# Patient Record
Sex: MALE | Race: WHITE | Employment: FULL TIME | URBAN - METROPOLITAN AREA
[De-identification: names, ages, dates, MRNs, and addresses within clinical notes are randomized per-mention and may not be internally consistent; named-entity substitution may affect disease eponyms.]

---

## 2020-06-11 ENCOUNTER — OFFICE VISIT (OUTPATIENT)
Dept: ORTHOPEDIC SURGERY | Age: 30
End: 2020-06-11
Payer: COMMERCIAL

## 2020-06-11 VITALS — WEIGHT: 165 LBS | HEIGHT: 73 IN | BODY MASS INDEX: 21.87 KG/M2

## 2020-06-11 PROCEDURE — 99203 OFFICE O/P NEW LOW 30 MIN: CPT | Performed by: FAMILY MEDICINE

## 2020-06-11 PROCEDURE — MISC916 THIGH SLEEVE: Performed by: FAMILY MEDICINE

## 2020-06-11 RX ORDER — NAPROXEN 500 MG/1
500 TABLET ORAL 2 TIMES DAILY WITH MEALS
Qty: 60 TABLET | Refills: 3 | Status: SHIPPED | OUTPATIENT
Start: 2020-06-11

## 2020-06-11 NOTE — LETTER
[facility] [address] [place] [address]  [address] [address] [facility] [address]  Phone: [phone]  Fax: [phone]    Kortney Mcqueen MD        June 11, 2020     Patient: Shahzad Kern   YOB: 1990   Date of Visit: 6/11/2020       To Whom It May Concern: It is my medical opinion that Shahzad Kern may return to light duty immediately with the following restrictions: lifting/carrying not to exceed 20 lbs. , pushing/pulling not to exceed 20 lbs. until seen back in the office in 3-4 weeks. If you have any questions or concerns, please don't hesitate to call.     Sincerely,        Kortney Mcqueen MD

## 2020-06-11 NOTE — PROGRESS NOTES
10'4 Chief Complaint  Leg Pain (N LEFT LEG)    History of Present Illness for New Patient:    Dorothy Bence is a 31 y.o. male is a very pleasant physically active white male who works as a  for [employer] who works part-time in [employer] but also at [employer] who is being seen today upon self-referral for evaluation of injury to his left leg knee and hamstring. He is a recreational  who plays between 2-3 times per week and on 6/1/2020 he was rounding first base when he felt a \"tweak\" to the mid belly of his left hamstring. There is no associated pop or crack and he did have only mild pain at that time. There is no swelling or bruising and he underwent relative rest only for the next 5 days and on 6/6/2020  he was playing in another softball game and during the games first play he hit the ball and sprinted out of the batter's box when he felt a sharp pop to the mid belly of his left hamstring. He did have immediate pain and was obviously hobbling but finished up the remainder of his softball game. Over the next 48 hours he began with progressive pain swelling and the development of ecchymosis to the posterior aspect of his left hamstring with associated 7-8 out of 10 pain he was actively limping for the first couple of days and was taking ibuprofen 800 mg twice daily and using a wrap. He did not use crutches and has no previous history of hamstring injury. He feels very tight with the majority his pain being mid belly extending to the posterior aspect of his left knee. He is very little pulling sensation to the proximal hamstring and does feel very stiff and weak. He is not having difficulty sleeping but with more aggressive activities and full toeing and pushing off he can still have pain the range of 7-8 out of 10. This is more sharp in character whereas at rest it is only a mild ache at 2-3 out of 10.   He has no previous history of substantial hamstring or knee injury. He is sleeping more comfortably at this point is being seen today for orthopedic and sports consultation with imaging. Pain Assessment  Location of Pain: Leg  Location Modifiers: Left  Severity of Pain: 4  Quality of Pain: Sharp, Aching(SPASM)  Duration of Pain: Persistent  Frequency of Pain: Constant  Aggravating Factors: Standing, Walking  Limiting Behavior: Yes  Relieving Factors: Rest, Nsaids  Result of Injury: Yes  Work-Related Injury: No  Are there other pain locations you wish to document?: No     I have reviewed and attest the documentation of the HPI documented by my . I will make any changes if necessary. Enc Date: 6/11/2020  Time: 3:51 PM  Provider: Mary Martin MD        Review of Systems  Pertinent items are noted in HPI  Review of systems reviewed from Patient History Form dated on 6/11/2020 and available in the patient's chart under the Media tab. Vital Signs     Ht 6' 1\" (1.854 m)   Wt 165 lb (74.8 kg)   BMI 21.77 kg/m²     Past Medical History   has no past medical history on file. Past Surgical History   has no past surgical history on file. Social History     Tobacco Use    Smoking status: Never Smoker    Smokeless tobacco: Never Used    Tobacco comment: Advised not start   Substance Use Topics    Alcohol use: Yes     Comment: 12 on weekends    Drug use: No        Current Outpatient Medications   Medication Sig Dispense Refill    naproxen (NAPROSYN) 500 MG tablet Take 1 tablet by mouth 2 times daily (with meals) 60 tablet 3     No current facility-administered medications for this visit. General Exam:   Constitutional: Patient is adequately groomed with no evidence of malnutrition  DTRs: Deep tendon reflexes are intact  Mental Status: The patient is oriented to time, place and person. The patient's mood and affect are appropriate.   Lymphatic: The lymphatic examination bilaterally reveals all areas to be without therapy and placed him on Naprosyn 500 mg 1 pill twice daily. He is out of competitive softball for at least the next 4 to 6 weeks to allow for adequate rehabilitation and functional progression. We did write him a work note recommending no lifting carrying pushing pulling greater than 20 to 25 pounds to allow for adequate hamstring rehabilitation over the next several weeks. Icing and activity modification importance of performing his home exercise program was discussed. We will see him back in about 3 to 4 weeks for follow-up. He will contact us in interim with questions or concerns. This dictation was performed with a verbal recognition program (DRAGON) and it was checked for errors. It is possible that there are still dictated errors within this office note. If so, please bring any errors to my attention for an addendum. All efforts were made to ensure that this office note is accurate.

## 2020-06-18 ENCOUNTER — HOSPITAL ENCOUNTER (OUTPATIENT)
Dept: PHYSICAL THERAPY | Age: 30
Setting detail: THERAPIES SERIES
Discharge: HOME | End: 2020-06-18
Payer: COMMERCIAL

## 2020-06-18 PROCEDURE — 97110 THERAPEUTIC EXERCISES: CPT

## 2020-06-18 PROCEDURE — 97161 PT EVAL LOW COMPLEX 20 MIN: CPT

## 2020-06-18 PROCEDURE — 97140 MANUAL THERAPY 1/> REGIONS: CPT

## 2020-06-18 NOTE — FLOWSHEET NOTE
Stretching     Hamstring 5x30 seated    Hamstring rope with adductor 5x30 rope     Hip Flexion     ITB     Grion     Quad     Inclined Calf     Towel Pull          SLR     Supine     Prone 3x10     Abduction     Adducton     SLR+     Clams                    Isometrics     Quad sets     Hip Adduction     Hamstring                    Patellar Glides     Medial     Superior     Inferior          ROM     Sheet Pulls     Wall Slides     Edge of bed     Flexionator     Extensionator     Hang Weights     Ankle Pumps                              CKC     Calf raises     Wall sits     Step ups     Step up and over     Lateral Step Downs               Mini squats     CC TKE          Lateral band walks     Side Planks     Half moon     Single leg flow          Biodex-balance     Single leg stance     Plyoback          Stool scoots     Bridges 3x10  Cues for glute set before lift    SB HS Curls     Planks          PRE     Extension  RANGE: 90/40   Flexion Standing green band 3x10  RANGE: 0/90        Cable Column          Leg Press  RANGE: 70/10        Bike     Treadmill                     Therapeutic Exercise and NMR EXR  [] (69877) Provided verbal/tactile cueing for activities related to strengthening, flexibility, endurance, ROM for improvements in LE, proximal hip, and core control with self care, mobility, lifting, ambulation.  [] (35736) Provided verbal/tactile cueing for activities related to improving balance, coordination, kinesthetic sense, posture, motor skill, proprioception  to assist with LE, proximal hip, and core control in self care, mobility, lifting, ambulation and eccentric single leg control.      NMR and Therapeutic Activities:    [] (53992 or 83736) Provided verbal/tactile cueing for activities related to improving balance, coordination, kinesthetic sense, posture, motor skill, proprioception and motor activation to allow for proper function of core, proximal hip and LE with self care and ADLs  [] Hafsa Nazario, PT, DPT, Cert DN

## 2020-06-18 NOTE — PLAN OF CARE
his left leg knee and hamstring. He is a recreational  who plays between 2-3 times per week and on 6/1/2020 he was rounding first base when he felt a \"tweak\" to the mid belly of his left hamstring. There is no associated pop or crack and he did have only mild pain at that time. There is no swelling or bruising and he underwent relative rest only for the next 5 days and on 6/6/2020  he was playing in another softball game and during the games first play he hit the ball and sprinted out of the batter's box when he felt a sharp pop to the mid belly of his left hamstring. He did have immediate pain and was obviously hobbling but finished up the remainder of his softball game. Over the next 48 hours he began with progressive pain swelling and the development of ecchymosis to the posterior aspect of his left hamstring with associated 7-8 out of 10 pain he was actively limping for the first couple of days and was taking ibuprofen 800 mg twice daily and using a wrap. He did not use crutches and has no previous history of hamstring injury. He feels very tight with the majority his pain being mid belly extending to the posterior aspect of his left knee. He is very little pulling sensation to the proximal hamstring and does feel very stiff and weak. He is not having difficulty sleeping but with more aggressive activities and full toeing and pushing off he can still have pain the range of 7-8 out of 10. This is more sharp in character whereas at rest it is only a mild ache at 2-3 out of 10. He has no previous history of substantial hamstring or knee injury. He is sleeping more comfortably at this point is being seen today for orthopedic and sports consultation with imaging. Today: Pt notes that over the last week he has been icing and wearing the compression sleeve. He notes that he can walk better this week. He has not been playing softball or running.  He does feel like squatting to the floor can be painful. At first it was hard getting in/out of the car for a while but that has improved. He would like to get back to running/softball as soon as possible. Relevant Medical History: nonsignificant   Functional Disability Index: LEFS    Pain Scale: 3/10  Easing factors: naproxen, compression sleeve, ice   Provocative factors:      Type: []Constant   [x]Intermittent  []Radiating []Localized []other:     Numbness/Tingling: pt denies    Functional Limitations/Impairments: []Sitting []Standing []Walking    []Squatting/bending  []Stairs           []ADL's  [x]Transfers [x]Sports/Recreation []Other:    Occupation/School: sales      Living Status/Prior Level of Function: Independent with ADLs and IADLs, softball 2-3x per week     OBJECTIVE:     Joint mobility:    [x]Normal    []Hypo   []Hyper    Palpation: mild ttp medial hamstring tendon     Functional Mobility/Transfers: wfl     Posture: wfl     Bandages/Dressings/Incisions: na     Gait: (include devices/WB status) wfl         Flexibility L R Comment   Hamstring Lacking 20 Lacking 10 90-90    Gastroc      ITB      Quad                ROM PROM AROM Overpressure Comment    L R L R L R    Flexion          Extension                                  Strength L R Comment   Quad 4+ 4+    Hamstring 4 4-    Gastroc      Hip flexor 4+ 4+    Hip ABD 4+ 4+                  Orthopedic Special Tests:   Special Test Results/Comment   Meniscal Click    Crepitus    Flexion Test    Valgus Laxity    Varus Laxity    Lachmans    Drop Back    Homans            Girth L R   Mid Patella     Suprapatellar     5cm above     15cm above                              [x] Patient history, allergies, meds reviewed. Medical chart reviewed. See intake form. Review Of Systems (ROS):  [x]Performed Review of systems (Integumentary, CardioPulmonary, Neurological) by intake and observation. Intake form has been scanned into medical record.  Patient has been instructed to contact their primary care physician regarding ROS issues if not already being addressed at this time. Co-morbidities/Complexities (which will affect course of rehabilitation):  [x]None           Arthritic conditions   []Rheumatoid arthritis (M05.9)  []Osteoarthritis (M19.91)   Cardiovascular conditions   []Hypertension (I10)  []Hyperlipidemia (E78.5)  []Angina pectoris (I20)  []Atherosclerosis (I70)   Musculoskeletal conditions   []Disc pathology   []Congenital spine pathologies   []Prior surgical intervention  []Osteoporosis (M81.8)  []Osteopenia (M85.8)   Endocrine conditions   []Hypothyroid (E03.9)  []Hyperthyroid Gastrointestinal conditions   []Constipation (A37.30)   Metabolic conditions   []Morbid obesity (E66.01)  []Diabetes type 1(E10.65) or 2 (E11.65)   []Neuropathy (G60.9)     Pulmonary conditions   []Asthma (J45)  []Coughing   []COPD (J44.9)   Psychological Disorders  []Anxiety (F41.9)  []Depression (F32.9)   []Other:   []Other:          Barriers to/and or personal factors that will affect rehab potential:              []Age  []Sex              []Motivation/Lack of Motivation                        []Co-Morbidities              []Cognitive Function, education/learning barriers              []Environmental, home barriers              []profession/work barriers  []past PT/medical experience  []other:      Falls Risk Assessment (30 days):   [x] Falls Risk assessed and no intervention required.   [] Falls Risk assessed and Patient requires intervention due to being higher risk   TUG score (>12s at risk):     [] Falls education provided, including           Functional Assessment:   Functional Assessment scale used: LEFS  Score: 38%     ASSESSMENT:   Functional Impairments:     []Noted lumbar/proximal hip/LE joint hypomobility   [x]Decreased LE functional ROM   []Decreased core/proximal hip strength and neuromuscular control   [x]Decreased LE functional strength   []Reduced balance/proprioceptive control   []other:      Functional Activity Limitations (from functional questionnaire and intake)   []Reduced ability to tolerate prolonged functional positions   []Reduced ability or difficulty with changes of positions or transfers between positions   []Reduced ability to maintain good posture and demonstrate good body mechanics with sitting, bending, and lifting   []Reduced ability to sleep   [] Reduced ability or tolerance with driving and/or computer work   []Reduced ability to perform lifting, carrying tasks   []Reduced ability to squat   [x]Reduced ability to forward bend   []Reduced ability to ambulate prolonged functional periods/distances/surfaces   [x]Reduced ability to ascend/descend stairs   [x]Reduced ability to run, hop, cut or jump   []other:    Participation Restrictions   []Reduced participation in self care activities   []Reduced participation in home management activities   []Reduced participation in work activities   []Reduced participation in social activities. [x]Reduced participation in sport/recreation activities. Classification :    []Signs/symptoms consistent with post-surgical status including decreased ROM, strength and function.    []Signs/symptoms consistent with joint sprain/strain   []Signs/symptoms consistent with patella-femoral syndrome   []Signs/symptoms consistent with knee OA/hip OA   []Signs/symptoms consistent with internal derangement of knee/Hip   []Signs/symptoms consistent with functional hip weakness/NMR control      []Signs/symptoms consistent with tendinitis/tendinosis    []signs/symptoms consistent with pathology which may benefit from Dry needling      [x]other:   S/s consistent with mm strain     Prognosis/Rehab Potential:      []Excellent   [x]Good    []Fair   []Poor    Tolerance of evaluation/treatment:    []Excellent   [x]Good    []Fair   []Poor    PLAN  Frequency/Duration:  1 days per week for 2-3 Weeks:  Interventions:  [x]  Therapeutic exercise including: strength training, ROM, for Lower

## 2020-07-01 ENCOUNTER — HOSPITAL ENCOUNTER (OUTPATIENT)
Dept: PHYSICAL THERAPY | Age: 30
Setting detail: THERAPIES SERIES
Discharge: HOME | End: 2020-07-01
